# Patient Record
Sex: FEMALE | Race: BLACK OR AFRICAN AMERICAN
[De-identification: names, ages, dates, MRNs, and addresses within clinical notes are randomized per-mention and may not be internally consistent; named-entity substitution may affect disease eponyms.]

---

## 2020-09-30 ENCOUNTER — NURSE TRIAGE (OUTPATIENT)
Dept: OTHER | Facility: CLINIC | Age: 20
End: 2020-09-30

## 2020-09-30 NOTE — TELEPHONE ENCOUNTER
Reason for Disposition   Bleeding or spotting occurs after sex (Exception: first intercourse)    Answer Assessment - Initial Assessment Questions  1. AMOUNT: \"Describe the bleeding that you are having. \"     - SPOTTING: spotting, or pinkish / brownish mucous discharge; does not fill panti-liner or pad     - MILD:  less than 1 pad / hour; less than patient's usual menstrual bleeding    - MODERATE: 1-2 pads / hour; 1 menstrual cup every 6 hours; small-medium blood clots (e.g., pea, grape, small coin)    - SEVERE: soaking 2 or more pads/hour for 2 or more hours; 1 menstrual cup every 2 hours; bleeding not contained by pads or continuous red blood from vagina; large blood clots (e.g., golf ball, large coin)       More than spotting. Using tampons and changing every time she uses restroom, maybe 3 times a day. Not completely filled with blood  2. ONSET: \"When did the bleeding begin? \" \"Is it continuing now? \"  Now bleeding again. Took Plan B pill after a break in a condom on 9/24. Bleeding started again on 9/28  3. MENSTRUAL PERIOD: \"When was the last normal menstrual period? \" \"How is this different than your period? \"  Recent period 9/17-9/20  4. REGULARITY: \"How regular are your periods? \"   regular periods  5. ABDOMINAL PAIN: \"Do you have any pain? \" \"How bad is the pain? \"  (e.g., Scale 1-10; mild, moderate, or severe)    - MILD (1-3): doesn't interfere with normal activities, abdomen soft and not tender to touch     - MODERATE (4-7): interferes with normal activities or awakens from sleep, tender to touch     - SEVERE (8-10): excruciating pain, doubled over, unable to do any normal activities   Some cramps  6. PREGNANCY: \"Could you be pregnant? \" Porsha Peer you sexually active? \" \"Did you recently give birth? \"    Is sexually active  7. BREASTFEEDING: Porsha Peer you breastfeeding? \"  no  8. HORMONES: Porsha Peer you taking any hormone medications, prescription or OTC? \" (e.g., birth control pills, estrogen)  Not on birth control  9.  BLOOD THINNERS: \"Do you take any blood thinners? \" (e.g., Coumadin/warfarin, Pradaxa/dabigatran, aspirin)   no  10. CAUSE: \"What do you think is causing the bleeding? \" (e.g., recent gyn surgery, recent gyn procedure; known bleeding disorder, cervical cancer, polycystic ovarian disease, fibroids)          11. HEMODYNAMIC STATUS: \"Are you weak or feeling lightheaded? \" If so, ask: \"Can you stand and walk normally? \"    some dizziness at times, not currently feeling lightheaded  12. OTHER SYMPTOMS: \"What other symptoms are you having with the bleeding? \" (e.g., passed tissue, vaginal discharge, fever, menstrual-type cramps)    Protocols used: VAGINAL BLEEDING - ABNORMAL-ADULT-[place]  Pt calling with vaginal bleeding about a week after her period ended. Has normal periods. Has intercourse, condom broke and she took a Plan B on 9/24. Bleeding started again on 9/28. Is light, maybe changes the tampon 3 times a day. No other symptoms. Has appt scheduled with PCP next week. Will keep appt and call PCP sooner if bleeding increases or any new symptoms.